# Patient Record
Sex: MALE | Race: BLACK OR AFRICAN AMERICAN
[De-identification: names, ages, dates, MRNs, and addresses within clinical notes are randomized per-mention and may not be internally consistent; named-entity substitution may affect disease eponyms.]

---

## 2017-03-02 ENCOUNTER — HOSPITAL ENCOUNTER (EMERGENCY)
Dept: HOSPITAL 68 - ERH | Age: 62
LOS: 1 days | End: 2017-03-03
Payer: COMMERCIAL

## 2017-03-02 VITALS — BODY MASS INDEX: 42.44 KG/M2 | WEIGHT: 280 LBS | HEIGHT: 68 IN

## 2017-03-02 DIAGNOSIS — K62.5: Primary | ICD-10-CM

## 2017-03-02 LAB
ABSOLUTE GRANULOCYTE CT: 6.3 /CUMM (ref 1.4–6.5)
APTT BLD: 33 SEC (ref 25–37)
BASOPHILS # BLD: 0 /CUMM (ref 0–0.2)
BASOPHILS NFR BLD: 0.3 % (ref 0–2)
EOSINOPHIL # BLD: 0.2 /CUMM (ref 0–0.7)
EOSINOPHIL NFR BLD: 1.9 % (ref 0–5)
ERYTHROCYTE [DISTWIDTH] IN BLOOD BY AUTOMATED COUNT: 13.9 % (ref 11.5–14.5)
GRANULOCYTES NFR BLD: 62.1 % (ref 42.2–75.2)
HCT VFR BLD CALC: 51.4 % (ref 42–52)
LYMPHOCYTES # BLD: 2.7 /CUMM (ref 1.2–3.4)
MCH RBC QN AUTO: 29.7 PG (ref 27–31)
MCHC RBC AUTO-ENTMCNC: 33 G/DL (ref 33–37)
MCV RBC AUTO: 89.8 FL (ref 80–94)
MONOCYTES # BLD: 1 /CUMM (ref 0.1–0.6)
PLATELET # BLD: 176 /CUMM (ref 130–400)
PMV BLD AUTO: 8.8 FL (ref 7.4–10.4)
PROTHROMBIN TIME: 10.8 SEC (ref 9.4–12.5)
RED BLOOD CELL CT: 5.73 /CUMM (ref 4.7–6.1)
WBC # BLD AUTO: 10.1 /CUMM (ref 4.8–10.8)

## 2017-03-02 NOTE — ED GI/GU/ABDOMINAL COMPLAINT
History of Present Illness
 
General
Chief Complaint: General Adult
Stated Complaint: RECTAL BLEED
Source: patient, family, old records
Exam Limitations: no limitations
 
Vital Signs & Intake/Output
Vital Signs & Intake/Output
 Vital Signs
 
 
Date Time Temp Pulse Resp B/P Pulse O2 O2 Flow FiO2
 
     Ox Delivery Rate 
 
03/03 0041 97.2 87 20 152/95 97 Room Air  
 
03/02 2104 98.2 95 20 147/98 96 Room Air  
 
 
 ED Intake and Output
 
 
 03/03 0000 03/02 1200
 
Intake Total  
 
Output Total  
 
Balance  
 
   
 
Patient 280 lb 
 
Weight  
 
 
Allergies
Coded Allergies:
STATINS (Severe, MUSCLE ACHING, "TAKES MY BREATH AWAY" 03/02/17)
soybean (Severe, "CAN'T BREATHE" 03/02/17)
 
Triage Note:
TRIAGE: PT TO ER C/C RECTAL BLEEDING THIS EVENING.
STATES "WHEN I WENT TO THE BATHROOM EVERYTHING
THAT CAME OUT WAS RED".  BRIGHT RED IN COLOR.
UNSURE IF THERE WAS CLOTS. HAS HX OF BLEEDING
HEMMORRHOIDS. HAS BEEN TAKING DOANS FOR BACK PAIN
WHICH HE HAS HAD SINCE THE [facility].
Triage Nurses Notes Reviewed? yes
Onset: Just prior to arrival
Duration: minute(s):, continues in ED
Timing: recent history
Quality/Severity: moderate, painless
Radiation: no radiation
Activities at Onset: moving bowels
Prior Abdominal Problems: similar symptoms
Past Sexual History: Unobtainable at this time
No Modifying Factors: none
HPI:
Patient is been struggling with chronic back pain taking anti-inflammatory 
medications with little relief.
Prior to admission patient reports painless rectal bleeding.  Last year he had a
similar episode lasting 3 days prescribed suppositories.
He denies fever chills nausea vomiting diarrhea abdominal pain dysuria rash 
headache.
 
 
Past History
 
Travel History
Traveled to [place] past 21 day No
 
Medical History
Any Pertinent Medical History? see below for history
Neurological: NONE
EENT: allergies
Cardiovascular: hyperlipidemia
Respiratory: NONE
Gastrointestinal: HEMMORRHOIDS ACID REFLUX
Hepatic: NONE
Renal: NONE
Musculoskeletal: osteoarthritis
Psychiatric: NONE
Endocrine: diabetes
Blood Disorders: NONE
Cancer(s): NONE
GYN/Reproductive: NONE
 
Surgical History
Surgical History: non-contributory
 
Psychosocial History
What is your primary language English
Tobacco Use: Current Not Daily
Daily Tobacco Use Amount/Type: Cigar or Pipe use daily
ETOH Use: occasional use
Illicit Drug Use: denies illicit drug use
 
Family History
Hx Contributory? No
 
Review of Systems
 
Review of Systems
Constitutional:
Reports: no symptoms. 
EENTM:
Reports: no symptoms. 
Respiratory:
Reports: no symptoms. 
Cardiovascular:
Reports: no symptoms. 
GI:
Reports: see HPI, bloody stool. 
Genitourinary:
Reports: no symptoms. 
Musculoskeletal:
Reports: no symptoms. 
Skin:
Reports: no symptoms. 
Neurological/Psychological:
Reports: no symptoms. 
Hematologic/Endocrine:
Reports: no symptoms. 
Immunologic/Allergic:
Reports: no symptoms. 
All Other Systems: Reviewed and Negative
 
Physical Exam
 
Physical Exam
General Appearance: well developed/nourished, alert, awake, anxious, mild 
distress, obese
Head: atraumatic, normal appearance
Eyes:
Bilateral: normal appearance, PERRL, EOMI, normal inspection. 
Ears, Nose, Throat, Mouth: hearing grossly normal, moist mucous membrane
Neck: normal inspection, supple, full range of motion, normal alignment
Respiratory: normal breath sounds, chest non-tender, no respiratory distress, 
quiet respiration, lungs clear
Cardiovascular: regular rate/rhythm, normal peripheral pulses, norml femoral 
pulses equa
Peripheral Pulses:
4+ carotid (R), 4+ carotid (L)
Gastrointestinal: normal bowel sounds, soft, non-tender, no organomegaly
Rectal: normal rectal tone, scanty blood without stool
Male Genitals: normal genitalia
Back: normal inspection, muscle spasm, no vertebral tenderness
Extremities: normal range of motion, no ligament instability
Neurologic/Psych: no motor/sensory deficits, awake, alert, oriented x 3, normal 
gait, normal mood/affect, CNs II-XII nml as tested
Skin: intact, normal color, warm/dry
 
Core Measures
ACS in differential dx? No
Severe Sepsis Present: No
Septic Shock Present: No
 
Progress
Differential Diagnosis: gastritis, hemorrhoids, PUD/GERD
Plan of Care:
 Orders
 
 
Procedure Date/time Status
 
MISTAKE 03/02 2149 Active
 
PARTIAL THROMBOPLASTIN TIME 03/02 2149 Complete
 
PROTHROMBIN TIME 03/02 2149 Complete
 
COMPREHENSIVE METABOLIC PANEL 03/02 2149 Complete
 
CBC WITHOUT DIFFERENTIAL 03/02 2149 Complete
 
 
 Laboratory Tests
 
 
 
03/02/17 2215:
Anion Gap 11, Estimated GFR > 60, BUN/Creatinine Ratio 16.4, Glucose 96, Calcium
9.7, Total Bilirubin 0.6, AST 23, ALT 40, Alkaline Phosphatase 85, Total Protein
7.3, Albumin 3.9, Globulin 3.4, Albumin/Globulin Ratio 1.1, PT 10.8, INR 1.03, 
APTT 33, CBC w Diff NO MAN DIFF REQ, RBC 5.73, MCV 89.8, MCH 29.7, RDW 13.9, MPV
8.8, Gran % 62.1, Lymphocytes % 26.3, Monocytes % 9.4  H, Eosinophils % 1.9, 
Basophils % 0.3, Absolute Granulocytes 6.3, Absolute Lymphocytes 2.7, Absolute 
Monocytes 1.0  H, Absolute Eosinophils 0.2, Absolute Basophils 0, PUBS MCHC 33.0
Initial ED EKG: none
 
Departure
 
Departure
Time of Disposition: 0033
Disposition: HOME OR SELF CARE
Condition: Stable
Clinical Impression
Primary Impression: Rectal bleeding
Referrals:
TAISHA BAIRD,UMESH OVIEDO (PCP/Family)
 
MELCHOR NINO MD
 
Additional Instructions:
Use your lidocaine/steroid suppositories as prescribed
Departure Forms:
Customer Survey
General Discharge Information

## 2017-03-03 ENCOUNTER — HOSPITAL ENCOUNTER (OUTPATIENT)
Dept: HOSPITAL 68 - ERH | Age: 62
Setting detail: OBSERVATION
LOS: 3 days | End: 2017-03-06
Attending: INTERNAL MEDICINE | Admitting: INTERNAL MEDICINE
Payer: COMMERCIAL

## 2017-03-03 VITALS — DIASTOLIC BLOOD PRESSURE: 98 MMHG | SYSTOLIC BLOOD PRESSURE: 148 MMHG

## 2017-03-03 VITALS — BODY MASS INDEX: 42.44 KG/M2 | WEIGHT: 280 LBS | HEIGHT: 68 IN

## 2017-03-03 VITALS — SYSTOLIC BLOOD PRESSURE: 152 MMHG | DIASTOLIC BLOOD PRESSURE: 95 MMHG

## 2017-03-03 VITALS — SYSTOLIC BLOOD PRESSURE: 152 MMHG | DIASTOLIC BLOOD PRESSURE: 102 MMHG

## 2017-03-03 DIAGNOSIS — D12.0: Primary | ICD-10-CM

## 2017-03-03 DIAGNOSIS — K64.8: ICD-10-CM

## 2017-03-03 DIAGNOSIS — F17.200: ICD-10-CM

## 2017-03-03 DIAGNOSIS — E78.5: ICD-10-CM

## 2017-03-03 DIAGNOSIS — K21.9: ICD-10-CM

## 2017-03-03 DIAGNOSIS — K57.90: ICD-10-CM

## 2017-03-03 DIAGNOSIS — N40.0: ICD-10-CM

## 2017-03-03 DIAGNOSIS — I10: ICD-10-CM

## 2017-03-03 DIAGNOSIS — E11.9: ICD-10-CM

## 2017-03-03 LAB
ABSOLUTE GRANULOCYTE CT: 6 /CUMM (ref 1.4–6.5)
ABSOLUTE GRANULOCYTE CT: 6.1 /CUMM (ref 1.4–6.5)
BASOPHILS # BLD: 0 /CUMM (ref 0–0.2)
BASOPHILS # BLD: 0 /CUMM (ref 0–0.2)
BASOPHILS NFR BLD: 0.3 % (ref 0–2)
BASOPHILS NFR BLD: 0.6 % (ref 0–2)
EOSINOPHIL # BLD: 0.1 /CUMM (ref 0–0.7)
EOSINOPHIL # BLD: 0.1 /CUMM (ref 0–0.7)
EOSINOPHIL NFR BLD: 1.3 % (ref 0–5)
EOSINOPHIL NFR BLD: 1.5 % (ref 0–5)
ERYTHROCYTE [DISTWIDTH] IN BLOOD BY AUTOMATED COUNT: 14 % (ref 11.5–14.5)
ERYTHROCYTE [DISTWIDTH] IN BLOOD BY AUTOMATED COUNT: 14.4 % (ref 11.5–14.5)
GRANULOCYTES NFR BLD: 65.4 % (ref 42.2–75.2)
GRANULOCYTES NFR BLD: 72.3 % (ref 42.2–75.2)
HCT VFR BLD CALC: 47.4 % (ref 42–52)
HCT VFR BLD CALC: 49.7 % (ref 42–52)
LYMPHOCYTES # BLD: 1.6 /CUMM (ref 1.2–3.4)
LYMPHOCYTES # BLD: 2.3 /CUMM (ref 1.2–3.4)
MCH RBC QN AUTO: 29.6 PG (ref 27–31)
MCH RBC QN AUTO: 30 PG (ref 27–31)
MCHC RBC AUTO-ENTMCNC: 32.8 G/DL (ref 33–37)
MCHC RBC AUTO-ENTMCNC: 33.5 G/DL (ref 33–37)
MCV RBC AUTO: 89.5 FL (ref 80–94)
MCV RBC AUTO: 90.3 FL (ref 80–94)
MONOCYTES # BLD: 0.6 /CUMM (ref 0.1–0.6)
MONOCYTES # BLD: 0.8 /CUMM (ref 0.1–0.6)
PLATELET # BLD: 158 /CUMM (ref 130–400)
PLATELET # BLD: 161 /CUMM (ref 130–400)
PMV BLD AUTO: 8.7 FL (ref 7.4–10.4)
PMV BLD AUTO: 9.1 FL (ref 7.4–10.4)
PROTHROMBIN TIME: 11.8 SEC (ref 9.4–12.5)
RED BLOOD CELL CT: 5.26 /CUMM (ref 4.7–6.1)
RED BLOOD CELL CT: 5.55 /CUMM (ref 4.7–6.1)
WBC # BLD AUTO: 8.3 /CUMM (ref 4.8–10.8)
WBC # BLD AUTO: 9.4 /CUMM (ref 4.8–10.8)

## 2017-03-03 PROCEDURE — G0378 HOSPITAL OBSERVATION PER HR: HCPCS

## 2017-03-03 NOTE — NUR
PT ADMITTED TO ROOM # 216-1. ORAL REPORT GIVEN TO SOHAIL AKHTAR
ALL V.S.S. CLINICAL STATUS UNCHANGED, PT READY FOR TRANSFER.

## 2017-03-03 NOTE — CONS- GASTROENTEROLOGY
General Information and HPI
 
Consulting Request
Date of Consult: 03/03/17
Requested By:
Emergency Department
 
Reason for Consult:
Hematochezia
Source of Information: patient
History of Present Illness:
62-year-old male with a history of diverticular disease and "abscess" who had 
the acute onset of lower abdominal discomfort, loose bowel movements, and blood 
per rectum last night.  This persisted and he presented to emergency room.  He 
has had no syncope, lightheadedness, upper abdominal pain, antecedent bleeding 
or pain, chronic indigestion.  His last passage of blood was this morning
 
Allergies/Medications
Allergies:
Coded Allergies:
STATINS (Severe, MUSCLE ACHING, "TAKES MY BREATH AWAY" 03/02/17)
soybean (Severe, "CAN'T BREATHE" 03/02/17)
 
Home Med List:
Ammonium Lactate 12 % LOTION SKIN HEALTH  (Reported)
Azelastine HCl 137 MCG (0.1 %) SPRAY.PUMP   2 SPRAY NASB BIDP PRN ITCHING  (
Reported)
Cyclobenzaprine HCl 10 MG TABLET   1 TAB PO QPMP MUSCLE SPASMS  (Reported)
Dexlansoprazole (Dexilant) 60 MG CAP..BP   1 CAP PO DAILY ACID REFLUX  (
Reported)
Lidocaine/Hydrocortisone AC (Lidocaine-Hc 3-1% Cream Kit) 3 %-1 % (7 GRAM) KIT 
PAIN CONTROL  (Reported)
Meloxicam 15 MG TABLET   1 TAB PO DAILY AS NEEDED PAIN CONTROL  (Reported)
Montelukast Sodium 10 MG TABLET   1 TAB PO DAILY PAIN CONTROL  (Reported)
Olmesartan/Hydrochlorothiazide (Benicar Hct 20-12.5 MG Tablet) 20 MG-12.5 MG 
TABLET   1 TAB PO DAILY HIGH BLOOD PRESSURE  (Reported)
Omeprazole 40 MG CAPSULE.DR   1 CAP PO BID Reflux
Sitagliptin Phos/Metformin HCl (Janumet  MG Tablet) 50 MG-500 MG TABLET   
1 TAB PO BID DIABETES  (Reported)
Tamsulosin HCl 0.4 MG CAP.ER.24H   1 CAP PO QPM BLADDER HEALTH  (Reported)
Testosterone Cypionate 200 MG/ML VIAL   1 ML IM Q2W HEALTH SUPPLEMENT  (Reported
)
 
Current Medications:
 Current Medications
 
 
  Sig/Barb Start time  Last
 
Medication Dose Route Stop Time Status Admin
 
Chlorhexidine  1 GM .STK-MED ONE 03/03 1505 DC 
 
Gluconate  TOP 03/03 1506  
 
Sodium Chloride 1,000 ML ONCE ONE 03/03 1200 AC 03/03
 
  IV 03/03 1959  1237
 
Sodium Phosphate 1 UNIT ONCE ONE 03/03 1245 DC 03/03
 
  DC 03/03 1246  1314
 
 
 
 
Past History
 
Travel History
Traveled to [place] past 21 day No
 
Medical History
Neurological: NONE
EENT: allergies
Cardiovascular: hyperlipidemia
Respiratory: NONE
Gastrointestinal: HEMMORRHOIDS ACID REFLUX diverticulosis
Hepatic: NONE
Renal: NONE
Musculoskeletal: osteoarthritis
Psychiatric: NONE
Endocrine: diabetes
Blood Disorders: NONE
Cancer(s): NONE
GYN/Reproductive: NONE
 
Surgical History
Surgical History: non-contributory
 
Review of Systems
 
Review of Systems
Constitutional:
Denies: chills, fever. 
EENTM:
Denies: icterus, epistaxis. 
Cardiovascular:
Denies: chest pain, syncope. 
Respiratory:
Denies: cough, hemoptysis, short of breath. 
GI:
Reports: see HPI. 
Genitourinary:
Denies: dysuria, hematuria. 
Musculoskeletal:
Denies: joint swelling, neck pain. 
Skin:
Denies: jaundice, lesions. 
Neurological/Psychological:
Denies: cognitive dysfunction, numbness. 
Hematologic/Endocrine:
Reports: bleeding.  Denies: bruising. 
 
Exam & Diagnostic Data
Vital Signs and I&O
Vital Signs
 
 
Date Time Temp Pulse Resp B/P Pulse O2 O2 Flow FiO2
 
     Ox Delivery Rate 
 
03/03 1256 98.3 76 16 143/95 95 Room Air  
 
03/03 1240     97 Room Air  
 
03/03 1026 97.6 96 18 146/108 97 Room Air  
 
 
 Intake & Output
 
 
 03/03 1600 03/03 0400 03/02 1600 03/02 0400 03/01 1600 03/01 0400
 
Intake Total      
 
Output Total      
 
Balance      
 
       
 
Number 1     
 
Bowel      
 
Movements      
 
Patient 280 lb     
 
Weight      
 
 
 
Physical Exam:
Well-developed, obese -American male, no apparent distress.  Alert and 
oriented with normal cognition.  Skin without lesion.  No adenopathy.  No 
scleral icterus.  No oropharyngeal lesion.  Heart regular rhythm.  Lungs clear 
bilaterally.  Abdomen obese and soft with no tenderness, mass or organomegaly, 
with normal bowel sounds.  No edema.
 
Results
Pertinent Lab Results:
 Laboratory Tests
 
 
 03/03 03/03
 
 1137 1055
 
Chemistry  
 
  Sodium (137 - 145 mmol/L) 138 
 
  Potassium (3.5 - 5.1 mmol/L) 4.1 
 
  Chloride (98 - 107 mmol/L) 101 
 
  Carbon Dioxide (22 - 30 mmol/L) 27 
 
  Anion Gap (5 - 16) 10 
 
  BUN (9 - 20 mg/dL) 16 
 
  Creatinine (0.7 - 1.2 mg/dL) 0.9 
 
  Estimated GFR (>60 ml/min) > 60 
 
  BUN/Creatinine Ratio (7 - 25 %) 17.8 
 
  Glucose (65 - 99 mg/dL) 98 
 
  Calcium (8.4 - 10.2 mg/dL) 9.5 
 
Coagulation  
 
  PT (9.4 - 12.5 SEC) 11.8 
 
  INR (0.90 - 1.17) 1.13 
 
Hematology  
 
  CBC w Diff NO MAN DIFF REQ 
 
  WBC (4.8 - 10.8 /CUMM) 8.3 
 
  RBC (4.70 - 6.10 /CUMM) 5.55 
 
  Hgb (14.0 - 18.0 G/DL) 16.7 
 
  Hct (42 - 52 %) 49.7 
 
  MCV (80.0 - 94.0 FL) 89.5 
 
  MCH (27.0 - 31.0 PG) 30.0 
 
  RDW (11.5 - 14.5 %) 14.0 
 
  Plt Count (130 - 400 /CUMM) 161 
 
  MPV (7.4 - 10.4 FL) 9.1 
 
  Gran % (42.2 - 75.2 %) 72.3 
 
  Lymphocytes % (20.5 - 51.1 %) 18.9  L 
 
  Monocytes % (1.7 - 9.3 %) 6.9 
 
  Eosinophils % (0 - 5 %) 1.3 
 
  Basophils % (0.0 - 2.0 %) 0.6 
 
  Absolute Granulocytes (1.4 - 6.5 /CUMM) 6.0 
 
  Absolute Lymphocytes (1.2 - 3.4 /CUMM) 1.6 
 
  Absolute Monocytes (0.10 - 0.60 /CUMM) 0.6 
 
  Absolute Eosinophils (0.0 - 0.7 /CUMM) 0.1 
 
  Absolute Basophils (0.0 - 0.2 /CUMM) 0 
 
  PUBS MCHC (33.0 - 37.0 G/DL) 33.5 
 
Urines  
 
  Urine Color (YEL,AMB,STR)  YEL
 
  Urine Clarity (CLEAR)  CLEAR
 
  Urine pH (5.0 - 8.0)  6.0
 
  Ur Specific Gravity (1.001 - 1.035)  1.020
 
  Urine Protein (NEG,<30 MG/DL)  NEG
 
  Urine Ketones (NEG)  NEG
 
  Urine Nitrite (NEG)  NEG
 
  Urine Bilirubin (NEG)  NEG
 
  Urine Urobilinogen (0.1  -  1.0 EU/dl)  0.2
 
  Ur Leukocyte Esterase (NEG)  NEG
 
  Ur Microscopic  SEDIMENT EXAMINED
 
  Urine RBC (0 - 5 /HPF)  1-3
 
  Urine WBC (0 - 2 /HPF)  RARE
 
  Urine Bacteria (NEG/NONE)  RARE  H
 
  Urine Mucus (FEW,NONE)  FEW
 
  Urine Hemoglobin (NEG)  TRACE-INTACT  H
 
  Urine Glucose (N MG/DL)  NEG
 
 
 
 
Assessment/Plan
Assessment/Recommendations:
Acute onset hematochezia without hemodynamic compromise, and with normal 
hematocrit, without change since last night.  Most likely hemorrhoidal/outlet 
bleeding (although with loose bowel movement and pain, which is not consistent),
but rule out diverticular or other colonic source.
 
Recommendations
* Flexible sigmoidoscopy to be performed, with further recommendations to follow
* Follow CBC
 
 
Consult Acknowledgment
- Thank you for your consult request.

## 2017-03-03 NOTE — NUR
PT RETUNED TO ROOM FROM GI.
ACCORDING TO REPORT FROM GI RN, BLEEDING APPEARS TO BE COMING FROM AN INFLAMMED
DIVERTICULA. PT AWAKE AND ALERT, ALL V.S.S.

## 2017-03-03 NOTE — HISTORY & PHYSICAL
**See Addendum**
General Information and HPI
MD Statement:
I have seen and personally examined JACINTO MCKEON JR and documented this H&P.
 
The patient is a 62 year old M who presented with a patient stated chief 
complaint of [lower GI bleeding].
 
Source of Information: patient, family, old records
Exam Limitations: no limitations
History of Present Illness:
Mr. Mckeon 62 year old male with chief complaint of lower GI bleeding.  
Patient has past medical history of diverticulosis, diabetes, hypertension, GERD
, BPH, chronic back pain.
Patient reported that yesterday he started to have mai bleeding per rectum 
with minimal stool, he had 7 episodes up to now, he visited the ED yesterday and
received IV fluid and was discharged home, came again this afternoon with 
excessive rectal bleeding and had sigmoidoscope by Dr. Post.  Patient 
reported that his last normal bowel movement was yesterday morning, he used new 
medication "Doans" for back pain which is aspirin 2 tablets twice a day, and 
started to have bleeding per rectum afterwords.  He denied any abdominal pain, 
nausea or vomiting, urinary changes dysuria or hematuria, bleeding from nose, 
mouth, ears, bruises.
Patient denied chest pain, palpitation, shortness of breath, lightheadedness or 
dizziness.
Patient had his last colonoscope 2 years ago with Dr. Anna, few polyps were 
removed and was asked to do follow up colonoscopy in 5 years.
He denied history of constipation although he used to take Colace every other 
day. 
 
 
Allergies/Medications
Allergies:
Coded Allergies:
STATINS (Severe, MUSCLE ACHING, "TAKES MY BREATH AWAY" 03/02/17)
soybean (Severe, "CAN'T BREATHE" 03/02/17)
 
Home Med list
Ammonium Lactate 12 % LOTION SKIN HEALTH  (Reported)
Azelastine HCl 137 MCG (0.1 %) SPRAY.PUMP   2 SPRAY NASB BIDP PRN ITCHING  (
Reported)
Cyclobenzaprine HCl 10 MG TABLET   1 TAB PO QPMP MUSCLE SPASMS  (Reported)
Dexlansoprazole (Dexilant) 60 MG CAP.BP   1 CAP PO DAILY ACID REFLUX  (
Reported)
Lidocaine/Hydrocortisone AC (Lidocaine-Hc 3-1% Cream Kit) 3 %-1 % (7 GRAM) KIT 
PAIN CONTROL  (Reported)
Meloxicam 15 MG TABLET   1 TAB PO DAILY AS NEEDED PAIN CONTROL  (Reported)
Montelukast Sodium 10 MG TABLET   1 TAB PO DAILY PAIN CONTROL  (Reported)
Olmesartan/Hydrochlorothiazide (Benicar Hct 20-12.5 MG Tablet) 20 MG-12.5 MG 
TABLET   1 TAB PO DAILY HIGH BLOOD PRESSURE  (Reported)
Sitagliptin Phos/Metformin HCl (Janumet  MG Tablet) 50 MG-500 MG TABLET   
1 TAB PO BID DIABETES  (Reported)
Tamsulosin HCl 0.4 MG CAP.ER.24H   1 CAP PO QPM BLADDER HEALTH  (Reported)
Testosterone Cypionate 200 MG/ML VIAL   1 ML IM Q2W HEALTH SUPPLEMENT  (Reported
)
 
 
Past History
 
Travel History
Traveled to [place] past 21 day No
 
Medical History
Blood Transfusion Hx: No
Neurological: NONE
EENT: allergies
Cardiovascular: hyperlipidemia
Respiratory: NONE
Gastrointestinal: HEMMORRHOIDS ACID REFLUX diverticulosis
Hepatic: NONE
Renal: NONE
Musculoskeletal: osteoarthritis
Psychiatric: NONE
Endocrine: diabetes
Blood Disorders: NONE
Cancer(s): NONE
GYN/Reproductive: NONE
Influenza Vaccine: 10/01/16
 
Surgical History
Surgical History: non-contributory
 
Past Family/Social History
 
Psychosocial History
Smoking Status: Light Tobacco Smoker
 
Review of Systems
 
Review of Systems
Constitutional:
Denies: fever, malaise. 
EENTM:
Denies: blurred vision, nasal congestion. 
Cardiovascular:
Denies: chest pain, palpitations. 
Respiratory:
Denies: cough, short of breath. 
GI:
Denies: abdominal pain, nausea, vomiting. 
Genitourinary:
Denies: dysuria, hematuria. 
Musculoskeletal:
Reports: back pain.  Denies: joint pain. 
Neurological/Psychological:
Denies: headache. 
 
Exam & Diagnostic Data
Last 24 Hrs of Vital Signs/I&O
 Vital Signs
 
 
Date Time Temp Pulse Resp B/P Pulse O2 O2 Flow FiO2
 
     Ox Delivery Rate 
 
03/03 1811 98.2 75 20 148/98 96 Room Air  
 
03/03 1728  84 18 142/93 97 Room Air  
 
03/03 1510 97.9 67 18 138/83 98 Room Air  
 
03/03 1256 98.3 76 16 143/95 95 Room Air  
 
03/03 1240     97 Room Air  
 
03/03 1026 97.6 96 18 146/108 97 Room Air  
 
 
 Intake & Output
 
 
 03/03 1600 03/03 0800 03/03 0000
 
Intake Total   
 
Output Total   
 
Balance   
 
    
 
Number 1  
 
Bowel   
 
Movements   
 
Patient 127.006 kg  
 
Weight   
 
 
 
 
Physical Exam
General Appearance Alert, Oriented X3, Cooperative, No Acute Distress
Skin No Rashes, No Breakdown, No Significant Lesion
HEENT Atraumatic, PERRLA, EOMI, Mucous Membr. moist/pink
Neck Supple, No JVD
Cardiovascular Regular Rate, Normal S1, Normal S2, No Murmurs
Lungs Clear to Auscultation, Normal Air Movement
Abdomen Normal Bowel Sounds, Soft, No Tenderness
Neurological Normal Speech, Strength at 5/5 X4 Ext, Normal Tone, Sensation 
Intact, Cranial Nerves 3-12 NL, Reflexes 2+
Extremities No Clubbing, No Cyanosis, No Edema, Normal Pulses
Vascular Normal Pulses, Pulses Symmetrical
 
Assessment/Plan
Assessment:
Mr. Mckeon 62 year old male with chief complaint of lower GI bleeding.  
Patient has past medical history of diverticulosis, diabetes, hypertension, GERD
, BPH, chronic back pain.
 
 
Vital signs
Temperature 97.6, pulse 96, blood pressure 146/108, respiration 18 on room air 
saturation 97%
 
Lab
H&H 15.6/47.4, BMP within normal
 
Sigmoidoscopy
Impression:
* Diverticulosis
* Enlarged internal hemorrhoids, without evidence of active or recent bleeding
 
 
 
 Plan
-Observed the patient for 24 hours
-Advance the diet to clear liquid diet
-Continue monitor CBC twice a day
-Continue home medication except for aspirin and nonsteroidal anti-inflammatory 
drugs
-Code full
-DVT prophylaxis ALPS 
 
As Ranked By This Provider
Problem List:
 1. Rectal bleeding
 
 
Core Measures/Miscellaneous
 
Acute Coronary Syndrome
ACS Diagnosis: No
 
Cerebrovascular Accident
CVA/TIA Diagnosis: No
 
Congestive Heart Failure
CHF Diagnosis: No
 
Venous Thromboembolism
VTE Risk Factors: Age > 40
No Mech VTE prophylaxis d/t: No contraindications
No VTE Pharm Prophylaxis d/t: Active bleeding
VTE Diagnosis: No
VTE Type: NONE
VTE Confirmed by (Test): NONE
 
Severe Sepsis
Severe Sepsis Present: No
 
Septic Shock
Septic Shock Present: No
 
Miscellaneous Documentation
Attending Case Discussed With:
ELOY PEREZ MD
 
Primary Care Physician:
UMESH SUMNER MD
 
Patient sees these Specialists
GI
Podiatry
Level of Patient Care: ED Observation

## 2017-03-03 NOTE — NUR
61 Y/O MALE C/O RECTAL BLEEDING.  STATES HE WAS
EVAL'D IN ED LAST NIGHT AND CALLED DR JONES
TODAY, "HE SAID I WAS SUPPOSED TO BE ADMITTED". 
DENIES ANY NEW/ADDITIONAL COMPLAINTS.

## 2017-03-03 NOTE — NUR
Emergency Dept UC Admit Note:
 
To be admitted to [facility] by DR PEREZ with ACUTE LOWER GI BLEED as
the diagnosis, to GEN MED/OBSERVATION location.
 
Nursing Supervisor and admitting notified 03/03/17 at 1612
 
PT WILL GO TO ROOM 216-1

## 2017-03-03 NOTE — ED GI/GU/ABDOMINAL COMPLAINT
History of Present Illness
 
General
Chief Complaint: General Adult
Stated Complaint: RECTAL BLEEDING, WAS HERE LAST NIGHT
Source: patient, family, old records
Exam Limitations: no limitations
 
Vital Signs & Intake/Output
Vital Signs & Intake/Output
 Vital Signs
 
 
Date Time Temp Pulse Resp B/P Pulse O2 O2 Flow FiO2
 
     Ox Delivery Rate 
 
03/03 1510 97.9 67 18 138/83 98 Room Air  
 
03/03 1256 98.3 76 16 143/95 95 Room Air  
 
03/03 1240     97 Room Air  
 
03/03 1026 97.6 96 18 146/108 97 Room Air  
 
 
Allergies
Coded Allergies:
STATINS (Severe, MUSCLE ACHING, "TAKES MY BREATH AWAY" 03/02/17)
soybean (Severe, "CAN'T BREATHE" 03/02/17)
 
Reconcile Medications
Ammonium Lactate 12 % LOTION SKIN HEALTH  (Reported)
Azelastine HCl 137 MCG (0.1 %) SPRAY.PUMP   2 SPRAY NASB BIDP PRN ITCHING  (
Reported)
Cyclobenzaprine HCl 10 MG TABLET   1 TAB PO QPMP MUSCLE SPASMS  (Reported)
Dexlansoprazole (Dexilant) 60 MG CAP.BP   1 CAP PO DAILY ACID REFLUX  (
Reported)
Lidocaine/Hydrocortisone AC (Lidocaine-Hc 3-1% Cream Kit) 3 %-1 % (7 GRAM) KIT 
PAIN CONTROL  (Reported)
Meloxicam 15 MG TABLET   1 TAB PO DAILY AS NEEDED PAIN CONTROL  (Reported)
Montelukast Sodium 10 MG TABLET   1 TAB PO DAILY PAIN CONTROL  (Reported)
Olmesartan/Hydrochlorothiazide (Benicar Hct 20-12.5 MG Tablet) 20 MG-12.5 MG 
TABLET   1 TAB PO DAILY HIGH BLOOD PRESSURE  (Reported)
Sitagliptin Phos/Metformin HCl (Janumet  MG Tablet) 50 MG-500 MG TABLET   
1 TAB PO BID DIABETES  (Reported)
Tamsulosin HCl 0.4 MG CAP.ER.24H   1 CAP PO QPM BLADDER HEALTH  (Reported)
Testosterone Cypionate 200 MG/ML VIAL   1 ML IM Q2W HEALTH SUPPLEMENT  (Reported
)
 
Triage Note:
61 Y/O MALE C/O RECTAL BLEEDING.  STATES HE WAS
 EVAL'D IN ED LAST NIGHT AND CALLED DR JONES
 TODAY, "HE SAID I WAS SUPPOSED TO BE ADMITTED".
 DENIES ANY NEW/ADDITIONAL COMPLAINTS.
Triage Nurses Notes Reviewed? yes
HPI:
Patient is a 62 year old male presents complaining of rectal bleeding.  Bleeding
onset yesterday.  6 episodes of blood per rectum since onset.  Patient was seen 
in the ED yesterday evening, given IV fluids, protonix IV and discharge home.  
Patient called Gi Dr. Post today and was referred back to the ED for 
continued epsiodes.  Mild diffuse abdominal discomfort.  Patient with history of
internal hemorrhoid, tried using a steroid suppository with no improvement.  
Decreased food and fluid intake over the past 24 hours.  Patient takes meloxicam
regularly.  Occasionally takes Advil or aleve, also took Jeffy(aspirin) yesterday
for low back pain.  Denies lightheadedness, chest pain, dyspnea, nausea, 
vomiting, fevers, chills.
(FREDDY BECKHAM)
 
Past History
 
Travel History
Traveled to [place] past 21 day No
 
Medical History
Any Pertinent Medical History? see below for history
Neurological: NONE
EENT: allergies
Cardiovascular: hyperlipidemia
Respiratory: NONE
Gastrointestinal: HEMMORRHOIDS ACID REFLUX, diverticulosis
Hepatic: NONE
Renal: NONE
Musculoskeletal: osteoarthritis
Psychiatric: NONE
Endocrine: diabetes
Blood Disorders: NONE
Cancer(s): NONE
GYN/Reproductive: NONE
 
Surgical History
Surgical History: non-contributory
 
Psychosocial History
What is your primary language English
Tobacco Use: Never used
 
Family History
Hx Contributory? No
(FREDDY BECKHAM)
 
Review of Systems
 
Review of Systems
Constitutional:
Denies: chills, fever. 
EENTM:
Reports: no symptoms. 
Respiratory:
Denies: cough, short of breath. 
Cardiovascular:
Denies: chest pain, syncope. 
GI:
Reports: see HPI, abdominal pain, bloody stool.  Denies: nausea, vomiting. 
Genitourinary:
Reports: no symptoms. 
Musculoskeletal:
Reports: back pain. 
Skin:
Reports: no symptoms. 
Neurological/Psychological:
Reports: no symptoms. 
Hematologic/Endocrine:
Reports: see HPI. 
Immunologic/Allergic:
Reports: no symptoms. 
(FREDDY BECKHAM)
 
Physical Exam
 
Physical Exam
General Appearance: well developed/nourished, alert, awake, obese
Head: atraumatic, normal appearance
Eyes:
Bilateral: normal appearance, PERRL, EOMI. 
Ears, Nose, Throat, Mouth: hearing grossly normal, moist mucous membrane
Neck: normal inspection, supple, full range of motion
Respiratory: normal breath sounds, chest non-tender, no respiratory distress, 
lungs clear
Cardiovascular: regular rate/rhythm (borderline tachycardic)
Gastrointestinal: normal bowel sounds, soft, non-tender
Rectal: small amount of bright red blood in rectal vault.  No external 
hemorrhoid
Back: normal inspection, normal range of motion, no vertebral tenderness
Extremities: normal range of motion
Neurologic/Psych: no motor/sensory deficits, awake, alert, oriented x 3, normal 
gait, normal mood/affect
Skin: intact, normal color, warm/dry
 
Core Measures
ACS in differential dx? No
Severe Sepsis Present: No
Septic Shock Present: No
(ALEJANDRA SCHAFER,FREDDY)
 
Progress
Differential Diagnosis: bleeding hemorrhoids, diverticular bleed, upper GI bleed
Plan of Care:
 Orders
 
 
Procedure Date/time Status
 
Clear Liquid Diet 03/03 D Active
 
Patient Data 03/03 1557 Active
 
Place in observation 03/03 1548 Active
 
Vital Signs 03/03 1505 Active
 
Code Status 03/03 1505 Active
 
MISTAKE 03/03 1119 Active
 
PROTHROMBIN TIME 03/03 1119 Complete
 
BASIC METABOLIC PANEL 03/03 1119 Complete
 
TYPE & SCREEN (NOT X-MATCH) 03/03 1119 Complete
 
CBC WITHOUT DIFFERENTIAL 03/03 1108 Complete
 
URINALYSIS 03/03 1054 Complete
 
 
 Laboratory Tests
 
 
 
03/03/17 1137:
Anion Gap 10, Estimated GFR > 60, BUN/Creatinine Ratio 17.8, Glucose 98, Calcium
9.5, PT 11.8, INR 1.13, CBC w Diff NO MAN DIFF REQ, RBC 5.55, MCV 89.5, MCH 30.0
, RDW 14.0, MPV 9.1, Gran % 72.3, Lymphocytes % 18.9  L, Monocytes % 6.9, 
Eosinophils % 1.3, Basophils % 0.6, Absolute Granulocytes 6.0, Absolute 
Lymphocytes 1.6, Absolute Monocytes 0.6, Absolute Eosinophils 0.1, Absolute 
Basophils 0, PUBS MCHC 33.5
 
03/03/17 1055:
Urine Color YEL, Urine Clarity CLEAR, Urine pH 6.0, Ur Specific Gravity 1.020, 
Urine Protein NEG, Urine Ketones NEG, Urine Nitrite NEG, Urine Bilirubin NEG, 
Urine Urobilinogen 0.2, Ur Leukocyte Esterase NEG, Ur Microscopic SEDIMENT 
EXAMINED, Urine RBC 1-3, Urine WBC RARE, Urine Bacteria RARE  H, Urine Mucus FEW
, Urine Hemoglobin TRACE-INTACT  H, Urine Glucose NEG
1135: Discussed with Dr. Samuel.
3/3/2017 12:05:21 PM: Results of repeat CBC discussed with patient.  Patient 
resting comfortably.  No significant change in hemoglobin since yesterday 
evening.  Awaiting call back from Dr. Post to discuss patient.
1215: Discussed with Dr. Post: will take patient for sigmoidoscopy this 
afternoon, will call back prior as patient will need enema approximately 30 
minutes prior to arrival.
1500: Discussed with Dr. Post: patient has internal hemorrhoids, no bleeding
from the hemorrhoids.  Diverticulosis seen on the sigmoidoscopy but no active 
bleeding in the area visualized.  Given extent of patient's diverticulosis, 
recommends bringing for observation, clear liquid diet, serial CBC's(twice a day
), contact immediately if any emergent changes
(FREDDY BECKHAM)
Initial ED EKG: none
(FREDDY BECKHAM)
 
Departure
 
Departure
Time of Disposition: 1551
Disposition: STILL A PATIENT
Condition: Stable
Clinical Impression
Primary Impression: Rectal bleeding
Referrals:
TAISHA BAIRD,UMESH OVIEDO (PCP/Family)
 
Departure Forms:
Customer Survey
General Discharge Information
 
Observation Note
Spoke With:
ELOY PEREZ MD
Physician Advisor Notified: MADDIE BAIRD,LILIANE CHAN
Place Patient In: Non-ED OBS Care Area
Rationale for Observation:
My rational for observation is as follows: Serial complete blood cell counts, 
monitoring for any worsening bleeding.  Patient had sigmoidoscopy this afternoon
and gastroenterology recommends observation based on the findings of the 
sigmoidoscopy.
 
(FREDDY BECKHMA)
 
PA/NP Co-Sign Statement
Statement:
ED Attending supervision documentation-
 
[X] I saw and evaluated the patient. I have also reviewed all the pertinent lab 
results and diagnostic results. I agree with the findings and the plan of care 
as documented in the PA's/NP's documentation. 
 
[X] I have reviewed the ED Record and agree with the PA's/NP's documentation.
 
[] Additions or exceptions (if any) to the PAs/NP's note and plan are 
summarized below:
[]
 
(DANIEL BAIRD,TA)

## 2017-03-03 NOTE — PROC NOTE GASTROENTEROLOGY
Gastroenterology Procedure
Procedure Date: 03/03/17
GI Procedure(s):
Flexible sigmoidoscopy
:
Quan Post M.D.
 
ASA Classification: III
Indications:
Hematochezia
Meds Received:
None
Patient's Tolerance: good
Complications:
None
Extent Reached:
45 cm
Procedure:
The patient signed informed consent, and was placed in the Griffith position.  
Examination of the rectum was normal.  The Olympus high-definition variable 
stiffness colonoscope was inserted through the anus and advanced to the left 
colon, 45 cm from the anus..  Retroflexion was performed in order to examine the
rectum.
Findings:
There were enlarged internal hemorrhoids, but no friability, bleeding site, or 
evidence of recent bleeding such as nipple/geeta.  There was no blood within the 
distal rectum.  The rectal mucosa was normal.  There was sigmoid diverticulosis 
with scant red blood coating areas of the mucosa.  There was abundant stool, 
formed.  No active bleeding site, nor clot was identified.
Impression:
* Diverticulosis
* Enlarged internal hemorrhoids, without evidence of active or recent bleeding
Recommendations:
* Observation
* Clear liquid diet
* Follow-up CBC twice a day
CC:
TAISHA BAIRD,UMESH OVIEDO

## 2017-03-03 NOTE — NUR
ADMINISTERED FLEETS ENEMA AS ORDERED WITH POSITIVE RESULTS
PT HAD LARGE SEMI FORMED BOWEL MOVEMENT WITH BLOODY WATER NOTED. 
PT TOLERATED PROCEEDURE WELL.

## 2017-03-03 NOTE — NUR
NURSING NOTE: PATIENT ARRIVED TO FLOOR VIA STRETCHER WITH DISTRIBUTION FROM
ER. PATIENT A/OX3, ROOM AIR, SKIN INTACT, VSS. PATIENT INDEPENDENTLY WALKED
FROM STRETCHER TO BED. IV RUNNING NS @ 125 FROM ER AS PER ORDER. PATIENT'S
BELONGINGS BROUGHT FROM ER TO PATIENT ROOM PLACED IN CABINETS IN ROOM BY
SPOUSE. CLEAR LIQUID DIET IN PLACE. HAT PLACED IN BATHROOM, PATIENT INFORMED
TO HAVE BOWEL MOVEMENTS IN HAT FOR THEM TO BE TESTED. WILL CONTINUE TO
MONITOR.

## 2017-03-04 VITALS — SYSTOLIC BLOOD PRESSURE: 142 MMHG | DIASTOLIC BLOOD PRESSURE: 81 MMHG

## 2017-03-04 VITALS — DIASTOLIC BLOOD PRESSURE: 86 MMHG | SYSTOLIC BLOOD PRESSURE: 139 MMHG

## 2017-03-04 VITALS — DIASTOLIC BLOOD PRESSURE: 80 MMHG | SYSTOLIC BLOOD PRESSURE: 132 MMHG

## 2017-03-04 VITALS — SYSTOLIC BLOOD PRESSURE: 137 MMHG | DIASTOLIC BLOOD PRESSURE: 89 MMHG

## 2017-03-04 LAB
ABSOLUTE GRANULOCYTE CT: 5 /CUMM (ref 1.4–6.5)
BASOPHILS # BLD: 0 /CUMM (ref 0–0.2)
BASOPHILS NFR BLD: 0.4 % (ref 0–2)
EOSINOPHIL # BLD: 0.2 /CUMM (ref 0–0.7)
EOSINOPHIL NFR BLD: 2 % (ref 0–5)
ERYTHROCYTE [DISTWIDTH] IN BLOOD BY AUTOMATED COUNT: 14.2 % (ref 11.5–14.5)
GRANULOCYTES NFR BLD: 62 % (ref 42.2–75.2)
HCT VFR BLD CALC: 45.5 % (ref 42–52)
LYMPHOCYTES # BLD: 2.1 /CUMM (ref 1.2–3.4)
MCH RBC QN AUTO: 30.3 PG (ref 27–31)
MCHC RBC AUTO-ENTMCNC: 33.8 G/DL (ref 33–37)
MCV RBC AUTO: 89.6 FL (ref 80–94)
MONOCYTES # BLD: 0.8 /CUMM (ref 0.1–0.6)
PLATELET # BLD: 162 /CUMM (ref 130–400)
PMV BLD AUTO: 9.3 FL (ref 7.4–10.4)
RED BLOOD CELL CT: 5.07 /CUMM (ref 4.7–6.1)
WBC # BLD AUTO: 8.1 /CUMM (ref 4.8–10.8)

## 2017-03-04 NOTE — PN- GASTROENTEROLOGY
Assessment/Plan
Assessment/Recommendations:
Hematochezia, stopped.  Possible small diverticular bleed, versus hemorrhoidal (
although hemorrhoids were not bleeding or friability time of sigmoidoscopy 
yesterday).  Hematocrit has remained stable over 48 hours.
 
Recommendations
* Advance to regular diet
* May send home tonight if without evidence of recurrent bleeding.
* Follow-up with Dr. Anna in 10-14 days
 
 
Subjective
Subjective:
No bowel movements today.  No rectal bleeding.  Abdomen according to the patient
is somewhat bloated and tender.
 
Objective
Vital Signs and I&Os
Vital Signs
 
 
Date Time Temp Pulse Resp B/P Pulse O2 O2 Flow FiO2
 
     Ox Delivery Rate 
 
03/04 0654 98.2 63 20 139/86 95   
 
03/04 0016  61  137/89 94   
 
03/03 2207 97.4 70 20 152/102 96 Room Air  
 
03/03 1811 98.2 75 20 148/98 96 Room Air  
 
03/03 1728  84 18 142/93 97 Room Air  
 
03/03 1510 97.9 67 18 138/83 98 Room Air  
 
 
 Intake & Output
 
 
 03/04 1600 03/04 0400 03/03 1600 03/03 0400 03/02 1600 03/02 0400
 
Intake Total 220 740    
 
Output Total 1600 1000    
 
Balance -1380 -260    
 
       
 
Intake,  500    
 
Intake, Oral 120 240    
 
Number  1 1   
 
Bowel      
 
Movements      
 
Output, Urine 1600 1000    
 
Patient  280 lb 280 lb   
 
Weight      
 
 
 
Physical Exam:
Abdomen is mildly distended, soft.  There is no tenderness to palpation.
Current Medications:
 Current Medications
 
 
  Sig/Barb Start time  Last
 
Medication Dose Route Stop Time Status Admin
 
Acetaminophen 650 MG Q6P PRN 03/03 1930 AC 
 
  PO   
 
Acetaminophen 1,000 MG Q6P PRN 03/03 1930 AC 03/04
 
  IV   0414
 
Chlorhexidine  1 GM .STK-MED ONE 03/03 1505 DC 
 
Gluconate  TOP 03/03 1506  
 
Insulin Aspart 0 TIDAC 03/04 1200 AC 
 
  SC   
 
Losartan Potassium 50 MG DAILY 03/04 1600 AC 
 
  PO   
 
Montelukast Sodium 10 MG DAILY 03/04 1000 AC 
 
  PO   
 
Morphine Sulfate 2 MG Q6-PRN PRN 03/03 1945 AC 
 
  IV   
 
Omeprazole 20 MG DAILY AC 03/04 0700 AC 03/04
 
  PO   0422
 
Ondansetron HCl 4 MG ONCE ONE 03/03 2145 DC 03/03
 
  IV 03/03 2146  2200
 
Pantoprazole Sodium 20 MG DAILY 03/03 2200 DC 
 
  IV   
 
Pantoprazole Sodium 20 MG ONCE ONE 03/03 2145 DC 03/03
 
  IV 03/03 2146  2238
 
Patient Medication  1 UNIT ONE NR 03/04 0930 DC 
 
Teaching  ED 03/04 1000  
 
Sodium Chloride 1,000 ML ONCE ONE 03/03 1200 DC 03/03
 
  IV 03/03 1959  1237
 
Tamsulosin HCl 0.4 MG QPM 03/04 2200 AC 
 
  PO   
 
 
 
 
Results
Pertinent Lab Results:
 Laboratory Tests
 
 
 03/04 03/03 0626 2023
 
Hematology  
 
  CBC w Diff NO MAN DIFF REQ NO MAN DIFF REQ
 
  WBC (4.8 - 10.8 /CUMM) 8.1 9.4
 
  RBC (4.70 - 6.10 /CUMM) 5.07 5.26
 
  Hgb (14.0 - 18.0 G/DL) 15.4 15.6
 
  Hct (42 - 52 %) 45.5 47.4
 
  MCV (80.0 - 94.0 FL) 89.6 90.3
 
  MCH (27.0 - 31.0 PG) 30.3 29.6
 
  RDW (11.5 - 14.5 %) 14.2 14.4
 
  Plt Count (130 - 400 /CUMM) 162 158
 
  MPV (7.4 - 10.4 FL) 9.3 8.7
 
  Gran % (42.2 - 75.2 %) 62.0 65.4
 
  Lymphocytes % (20.5 - 51.1 %) 25.8 24.7
 
  Monocytes % (1.7 - 9.3 %) 9.8  H 8.1
 
  Eosinophils % (0 - 5 %) 2.0 1.5
 
  Basophils % (0.0 - 2.0 %) 0.4 0.3
 
  Absolute Granulocytes (1.4 - 6.5 /CUMM) 5.0 6.1
 
  Absolute Lymphocytes (1.2 - 3.4 /CUMM) 2.1 2.3
 
  Absolute Monocytes (0.10 - 0.60 /CUMM) 0.8  H 0.8  H
 
  Absolute Eosinophils (0.0 - 0.7 /CUMM) 0.2 0.1
 
  Absolute Basophils (0.0 - 0.2 /CUMM) 0 0
 
  PUBS MCHC (33.0 - 37.0 G/DL) 33.8 32.8  L
 
 
 
 
 03/03 03/03
 
 1137 1055
 
Chemistry  
 
  Sodium (137 - 145 mmol/L) 138 
 
  Potassium (3.5 - 5.1 mmol/L) 4.1 
 
  Chloride (98 - 107 mmol/L) 101 
 
  Carbon Dioxide (22 - 30 mmol/L) 27 
 
  Anion Gap (5 - 16) 10 
 
  BUN (9 - 20 mg/dL) 16 
 
  Creatinine (0.7 - 1.2 mg/dL) 0.9 
 
  Estimated GFR (>60 ml/min) > 60 
 
  BUN/Creatinine Ratio (7 - 25 %) 17.8 
 
  Glucose (65 - 99 mg/dL) 98 
 
  Calcium (8.4 - 10.2 mg/dL) 9.5 
 
Coagulation  
 
  PT (9.4 - 12.5 SEC) 11.8 
 
  INR (0.90 - 1.17) 1.13 
 
Hematology  
 
  CBC w Diff NO MAN DIFF REQ 
 
  WBC (4.8 - 10.8 /CUMM) 8.3 
 
  RBC (4.70 - 6.10 /CUMM) 5.55 
 
  Hgb (14.0 - 18.0 G/DL) 16.7 
 
  Hct (42 - 52 %) 49.7 
 
  MCV (80.0 - 94.0 FL) 89.5 
 
  MCH (27.0 - 31.0 PG) 30.0 
 
  RDW (11.5 - 14.5 %) 14.0 
 
  Plt Count (130 - 400 /CUMM) 161 
 
  MPV (7.4 - 10.4 FL) 9.1 
 
  Gran % (42.2 - 75.2 %) 72.3 
 
  Lymphocytes % (20.5 - 51.1 %) 18.9  L 
 
  Monocytes % (1.7 - 9.3 %) 6.9 
 
  Eosinophils % (0 - 5 %) 1.3 
 
  Basophils % (0.0 - 2.0 %) 0.6 
 
  Absolute Granulocytes (1.4 - 6.5 /CUMM) 6.0 
 
  Absolute Lymphocytes (1.2 - 3.4 /CUMM) 1.6 
 
  Absolute Monocytes (0.10 - 0.60 /CUMM) 0.6 
 
  Absolute Eosinophils (0.0 - 0.7 /CUMM) 0.1 
 
  Absolute Basophils (0.0 - 0.2 /CUMM) 0 
 
  PUBS MCHC (33.0 - 37.0 G/DL) 33.5 
 
Urines  
 
  Urine Color (YEL,AMB,STR)  YEL
 
  Urine Clarity (CLEAR)  CLEAR
 
  Urine pH (5.0 - 8.0)  6.0
 
  Ur Specific Gravity (1.001 - 1.035)  1.020
 
  Urine Protein (NEG,<30 MG/DL)  NEG
 
  Urine Ketones (NEG)  NEG
 
  Urine Nitrite (NEG)  NEG
 
  Urine Bilirubin (NEG)  NEG
 
  Urine Urobilinogen (0.1  -  1.0 EU/dl)  0.2
 
  Ur Leukocyte Esterase (NEG)  NEG
 
  Ur Microscopic  SEDIMENT EXAMINED
 
  Urine RBC (0 - 5 /HPF)  1-3
 
  Urine WBC (0 - 2 /HPF)  RARE
 
  Urine Bacteria (NEG/NONE)  RARE  H
 
  Urine Mucus (FEW,NONE)  FEW
 
  Urine Hemoglobin (NEG)  TRACE-INTACT  H
 
  Urine Glucose (N MG/DL)  NEG

## 2017-03-04 NOTE — PATIENT DISCHARGE INSTRUCTIONS
Discharge Instructions
 
General Discharge Information
You were seen/treated for:
Lower GI bleeding
Watch for these problems:
Bleeding per rectum
 
Special Instructions:
-His follow-up with your PCP within 1 week after discharge
-Please follow-up with Dr. Anna in 10-14 days after discharge
 
 
Acute Coronary Syndrome
 
Inclusion Criteria
At DC or during hospital stay patient has or had the following:
ACS DIAGNOSIS No
 
Discharge Core Measures
Meds if any: Prescribed or Continued at Discharge
Meds if any: NOT Prescribed or Continued at Discharge
 
Congestive Heart Failure
 
Inclusion Criteria
At DC or during hospital stay patient has or had the following:
CHF DIAGNOSIS No
 
Discharge Core Measures
Meds if any: Prescribed or Continued at Discharge
Meds if any: NOT Prescribed or Continued at Discharge
 
Cerebrovascular accident
 
Inclusion Criteria
At DC or during hospital stay patient has or had the following:
CVA/TIA Diagnosis No
 
Discharge Core Measures
Meds if any: Prescribed or Continued at Discharge
Meds if any: NOT Prescribed or Continued at Discharge
 
Venous thromboembolism
 
Inclusion Criteria
VTE Diagnosis No
VTE Type NONE
VTE Confirmed by (Test) NONE
 

## 2017-03-04 NOTE — PN- HOUSESTAFF
**See Addendum**
Subjective
Follow-up For:
Lower GI bleeding
Subjective:
Patient was seen and examined this morning, he reported to bowel movement of 
solid stool mix it with blood and blood on toilet paper.  Patient denied 
abdominal pain but does reported abdominal discomfort across the stomach and at 
the left lower quadrant
'Throbbing in nature
'.  He denied nausea, vomiting.  He reported that his urine looks darker than 
usual.  He denied any dizziness, lightheadedness, visual changes, chest pain or 
palpitation.  Vital signs are stable.
 
Review of Systems
Constitutional:
Reports: see HPI. 
 
Objective
Last 24 Hrs of Vital Signs/I&O
 Vital Signs
 
 
Date Time Temp Pulse Resp B/P Pulse O2 O2 Flow FiO2
 
     Ox Delivery Rate 
 
 0654 98.2 63 20 139/86 95   
 
/ 0016  61  137/89 94   
 
 2207 97.4 70 20 152/102 96 Room Air  
 
 1811 98.2 75 20 148/98 96 Room Air  
 
 1728  84 18 142/93 97 Room Air  
 
 1510 97.9 67 18 138/83 98 Room Air  
 
 
 Intake & Output
 
 
  1600 /04 0800 / 0000
 
Intake Total  220 740
 
Output Total 600 1000 1000
 
Balance -600 -780 -260
 
    
 
Intake, IV  100 500
 
Intake, Oral  120 240
 
Number   1
 
Bowel   
 
Movements   
 
Output, Urine 600 1000 1000
 
Patient   127.006 kg
 
Weight   
 
 
 
 
Physical Exam
General Appearance: Alert, Oriented X3, Cooperative, No Acute Distress
Skin: No Rashes, No Breakdown, No Significant Lesion
HEENT: Atraumatic, PERRLA, EOMI, Mucous Membr. moist/pink
Neck: Supple
Cardiovascular: Regular Rate, Normal S1, Normal S2, No Murmurs
Lungs: Clear to Auscultation, Normal Air Movement
Abdomen: Normal Bowel Sounds, Soft, No Tenderness
Neurological: Normal Gait, Normal Speech, Strength at 5/5 X4 Ext, Normal Tone, 
Sensation Intact, Cranial Nerves 3-12 NL, Reflexes 2+
Extremities: No Clubbing, No Cyanosis, No Edema, Normal Pulses
 
Assessment/Plan
Assessment:
Mr. Mckeon 62 year old male with chief complaint of lower GI bleeding.  
Patient has past medical history of diverticulosis, diabetes, hypertension, GERD
, BPH, chronic back pain.
 
Sigmoidoscopy
Impression:
* Diverticulosis
* Enlarged internal hemorrhoids, without evidence of active or recent bleeding
 
 
 
 Plan
-Observation
-GI recommendation was obtained
-Advance the diet to regular diet
-CBC hemoglobin 15.4<15.6<16.7
-Continue home medication except for aspirin and nonsteroidal anti-inflammatory 
drugs
-Start NovoLog low-dose sliding scale TIDAC
-Code full
-DVT prophylaxis ALPS 
Problem List:
 1. Rectal bleeding
 
Pain Ratin
Pain Location:
Abdominal discomfort
Pain Goal: Pain 4 or less
Pain Plan:
Mild pain pathway
Tomorrow's Labs & Rationales:
CBC

## 2017-03-05 VITALS — SYSTOLIC BLOOD PRESSURE: 158 MMHG | DIASTOLIC BLOOD PRESSURE: 106 MMHG

## 2017-03-05 VITALS — SYSTOLIC BLOOD PRESSURE: 152 MMHG | DIASTOLIC BLOOD PRESSURE: 70 MMHG

## 2017-03-05 VITALS — DIASTOLIC BLOOD PRESSURE: 82 MMHG | SYSTOLIC BLOOD PRESSURE: 131 MMHG

## 2017-03-05 LAB
ABSOLUTE GRANULOCYTE CT: 4.5 /CUMM (ref 1.4–6.5)
BASOPHILS # BLD: 0 /CUMM (ref 0–0.2)
BASOPHILS NFR BLD: 0.3 % (ref 0–2)
EOSINOPHIL # BLD: 0.2 /CUMM (ref 0–0.7)
EOSINOPHIL NFR BLD: 2.2 % (ref 0–5)
ERYTHROCYTE [DISTWIDTH] IN BLOOD BY AUTOMATED COUNT: 14.1 % (ref 11.5–14.5)
GRANULOCYTES NFR BLD: 60.2 % (ref 42.2–75.2)
HCT VFR BLD CALC: 46.2 % (ref 42–52)
LYMPHOCYTES # BLD: 2.1 /CUMM (ref 1.2–3.4)
MCH RBC QN AUTO: 30.1 PG (ref 27–31)
MCHC RBC AUTO-ENTMCNC: 33.2 G/DL (ref 33–37)
MCV RBC AUTO: 90.8 FL (ref 80–94)
MONOCYTES # BLD: 0.7 /CUMM (ref 0.1–0.6)
PLATELET # BLD: 158 /CUMM (ref 130–400)
PMV BLD AUTO: 8.8 FL (ref 7.4–10.4)
RED BLOOD CELL CT: 5.09 /CUMM (ref 4.7–6.1)
WBC # BLD AUTO: 7.5 /CUMM (ref 4.8–10.8)

## 2017-03-05 NOTE — NUR
/114. SENT TEXT PAGE TO INTERN TO ADVISE.
PT ASYMPTOMATIC. HOLDING 1X BAG IV FLUIDS AT THIS TIME.
STARTED PT ON GO LYTELY AT 2030, PT ADVISED THAT HE DRANK THE ENTIRE GALLON
IN UNDER AN HOUR. PT HAS VOIDED ONLY 1X SINCE CONSUMING.
WILL CONTINUE TO MONITOR.

## 2017-03-05 NOTE — PN- GASTROENTEROLOGY
Assessment/Plan
Assessment/Recommendations:
Hematochezia, minor, intermittent.  Possible small diverticular bleed, versus 
hemorrhoidal (although hemorrhoids were not bleeding or friability time of 
sigmoidoscopy Friday).  Hematocrit has remained stable over more than 48 hours.
 
Recommendations
* Clear liquid diet now
* Agree with CT scan of the abdomen and pelvis with IV and po contrast
* If CT scan is negative for obstructive process or for diverticulitis/abscess, 
give GoLYTELY 1 gallon over 3-4 hours later today.  Will then proceed to 
colonoscopy tomorrow (nothing by mouth after midnight, IV fluids).
* Follow-up CBC in the morning
 
 
 
Subjective
Subjective:
Continues to have burning lower abdominal pain.  Passed a formed bowel movement 
this morning, accompanied by scant red blood.
 
Objective
Vital Signs and I&Os
Vital Signs
 
 
Date Time Temp Pulse Resp B/P Pulse O2 O2 Flow FiO2
 
     Ox Delivery Rate 
 
03/05 0922  60  131/82    
 
03/05 0638 96.8 60 20 131/82 98   
 
03/04 2300 97.7 72 18 132/80 95 Room Air  
 
03/04 2227  72  132/80    
 
03/04 1731  62  142/81    
 
03/04 1515 98.0 62 20 142/81 98 Room Air  
 
 
 Intake & Output
 
 
 03/05 1600 03/05 0400 03/04 1600 03/04 0400 03/03 1600 03/03 0400
 
Intake Total 300 480 970 740  
 
Output Total   1600 1000  
 
Balance 300 480 -630 -260  
 
       
 
Intake, IV   100 500  
 
Intake, Oral 300 480 870 240  
 
Number 1  0 1 1 
 
Bowel      
 
Movements      
 
Output, Urine   1600 1000  
 
Patient    280 lb 280 lb 
 
Weight      
 
 
 
 
Results
Pertinent Lab Results:
 Laboratory Tests
 
 
 03/05 03/05
 
 0935 0640
 
Chemistry  
 
  Hemoglobin A1c  Pending
 
  Lactic Acid (0.7 - 2.1 mmol/L) 1.9 
 
Hematology  
 
  CBC w Diff  NO MAN DIFF REQ
 
  WBC (4.8 - 10.8 /CUMM)  7.5
 
  RBC (4.70 - 6.10 /CUMM)  5.09
 
  Hgb (14.0 - 18.0 G/DL)  15.3
 
  Hct (42 - 52 %)  46.2
 
  MCV (80.0 - 94.0 FL)  90.8
 
  MCH (27.0 - 31.0 PG)  30.1
 
  RDW (11.5 - 14.5 %)  14.1
 
  Plt Count (130 - 400 /CUMM)  158
 
  MPV (7.4 - 10.4 FL)  8.8
 
  Gran % (42.2 - 75.2 %)  60.2
 
  Lymphocytes % (20.5 - 51.1 %)  27.9
 
  Monocytes % (1.7 - 9.3 %)  9.4  H
 
  Eosinophils % (0 - 5 %)  2.2
 
  Basophils % (0.0 - 2.0 %)  0.3
 
  Absolute Granulocytes (1.4 - 6.5 /CUMM)  4.5
 
  Absolute Lymphocytes (1.2 - 3.4 /CUMM)  2.1
 
  Absolute Monocytes (0.10 - 0.60 /CUMM)  0.7  H
 
  Absolute Eosinophils (0.0 - 0.7 /CUMM)  0.2
 
  Absolute Basophils (0.0 - 0.2 /CUMM)  0
 
  PUBS MCHC (33.0 - 37.0 G/DL)  33.2
 
 
 
 
 03/04 03/03
 
 0626 2023
 
Hematology  
 
  CBC w Diff NO MAN DIFF REQ NO MAN DIFF REQ
 
  WBC (4.8 - 10.8 /CUMM) 8.1 9.4
 
  RBC (4.70 - 6.10 /CUMM) 5.07 5.26
 
  Hgb (14.0 - 18.0 G/DL) 15.4 15.6
 
  Hct (42 - 52 %) 45.5 47.4
 
  MCV (80.0 - 94.0 FL) 89.6 90.3
 
  MCH (27.0 - 31.0 PG) 30.3 29.6
 
  RDW (11.5 - 14.5 %) 14.2 14.4
 
  Plt Count (130 - 400 /CUMM) 162 158
 
  MPV (7.4 - 10.4 FL) 9.3 8.7
 
  Gran % (42.2 - 75.2 %) 62.0 65.4
 
  Lymphocytes % (20.5 - 51.1 %) 25.8 24.7
 
  Monocytes % (1.7 - 9.3 %) 9.8  H 8.1
 
  Eosinophils % (0 - 5 %) 2.0 1.5
 
  Basophils % (0.0 - 2.0 %) 0.4 0.3
 
  Absolute Granulocytes (1.4 - 6.5 /CUMM) 5.0 6.1
 
  Absolute Lymphocytes (1.2 - 3.4 /CUMM) 2.1 2.3
 
  Absolute Monocytes (0.10 - 0.60 /CUMM) 0.8  H 0.8  H
 
  Absolute Eosinophils (0.0 - 0.7 /CUMM) 0.2 0.1
 
  Absolute Basophils (0.0 - 0.2 /CUMM) 0 0
 
  PUBS MCHC (33.0 - 37.0 G/DL) 33.8 32.8  L
 
 
 
 
 03/03 03/03
 
 1137 1055
 
Chemistry  
 
  Sodium (137 - 145 mmol/L) 138 
 
  Potassium (3.5 - 5.1 mmol/L) 4.1 
 
  Chloride (98 - 107 mmol/L) 101 
 
  Carbon Dioxide (22 - 30 mmol/L) 27 
 
  Anion Gap (5 - 16) 10 
 
  BUN (9 - 20 mg/dL) 16 
 
  Creatinine (0.7 - 1.2 mg/dL) 0.9 
 
  Estimated GFR (>60 ml/min) > 60 
 
  BUN/Creatinine Ratio (7 - 25 %) 17.8 
 
  Glucose (65 - 99 mg/dL) 98 
 
  Calcium (8.4 - 10.2 mg/dL) 9.5 
 
Coagulation  
 
  PT (9.4 - 12.5 SEC) 11.8 
 
  INR (0.90 - 1.17) 1.13 
 
Hematology  
 
  CBC w Diff NO MAN DIFF REQ 
 
  WBC (4.8 - 10.8 /CUMM) 8.3 
 
  RBC (4.70 - 6.10 /CUMM) 5.55 
 
  Hgb (14.0 - 18.0 G/DL) 16.7 
 
  Hct (42 - 52 %) 49.7 
 
  MCV (80.0 - 94.0 FL) 89.5 
 
  MCH (27.0 - 31.0 PG) 30.0 
 
  RDW (11.5 - 14.5 %) 14.0 
 
  Plt Count (130 - 400 /CUMM) 161 
 
  MPV (7.4 - 10.4 FL) 9.1 
 
  Gran % (42.2 - 75.2 %) 72.3 
 
  Lymphocytes % (20.5 - 51.1 %) 18.9  L 
 
  Monocytes % (1.7 - 9.3 %) 6.9 
 
  Eosinophils % (0 - 5 %) 1.3 
 
  Basophils % (0.0 - 2.0 %) 0.6 
 
  Absolute Granulocytes (1.4 - 6.5 /CUMM) 6.0 
 
  Absolute Lymphocytes (1.2 - 3.4 /CUMM) 1.6 
 
  Absolute Monocytes (0.10 - 0.60 /CUMM) 0.6 
 
  Absolute Eosinophils (0.0 - 0.7 /CUMM) 0.1 
 
  Absolute Basophils (0.0 - 0.2 /CUMM) 0 
 
  PUBS MCHC (33.0 - 37.0 G/DL) 33.5 
 
Urines  
 
  Urine Color (YEL,AMB,STR)  YEL
 
  Urine Clarity (CLEAR)  CLEAR
 
  Urine pH (5.0 - 8.0)  6.0
 
  Ur Specific Gravity (1.001 - 1.035)  1.020
 
  Urine Protein (NEG,<30 MG/DL)  NEG
 
  Urine Ketones (NEG)  NEG
 
  Urine Nitrite (NEG)  NEG
 
  Urine Bilirubin (NEG)  NEG
 
  Urine Urobilinogen (0.1  -  1.0 EU/dl)  0.2
 
  Ur Leukocyte Esterase (NEG)  NEG
 
  Ur Microscopic  SEDIMENT EXAMINED
 
  Urine RBC (0 - 5 /HPF)  1-3
 
  Urine WBC (0 - 2 /HPF)  RARE
 
  Urine Bacteria (NEG/NONE)  RARE  H
 
  Urine Mucus (FEW,NONE)  FEW
 
  Urine Hemoglobin (NEG)  TRACE-INTACT  H
 
  Urine Glucose (N MG/DL)  NEG

## 2017-03-05 NOTE — PN- HOUSESTAFF
**See Addendum**
Subjective
Follow-up For:
GI bleed
Abdominal discomfort
Complaints: Diffuse abdominal pain
Subjective:
Interval history:
Morning Mr. Mckeon reports as night he was woken up by diffuse abdominal 
discomfort with what he describes as a "burning sensation".  This was followed 
by one bowel movement with a small amount of formed stool and mai red blood.
He denies any dizziness associated with this episode.
He does report persistence in his abdominal discomfort and some mild nausea.  He
denies any fevers, chills, palpitations, shortness of breath at this time.
 
Review of Systems
Constitutional:
Reports: see HPI. 
EENTM:
Reports: no symptoms. 
Cardiovascular:
Reports: no symptoms. 
Respiratory:
Reports: no symptoms. 
Gastrointestinal:
Reports: see HPI. 
Genitourinary:
Reports: no symptoms. 
Musculoskeletal:
Reports: no symptoms. 
 
Objective
Last 24 Hrs of Vital Signs/I&O
 Vital Signs
 
 
Date Time Temp Pulse Resp B/P Pulse O2 O2 Flow FiO2
 
     Ox Delivery Rate 
 
03/05 0922  60  131/82    
 
03/05 0638 96.8 60 20 131/82 98   
 
03/04 2300 97.7 72 18 132/80 95 Room Air  
 
03/04 2227  72  132/80    
 
03/04 1731  62  142/81    
 
03/04 1515 98.0 62 20 142/81 98 Room Air  
 
 
 Intake & Output
 
 
 03/05 1600 03/05 0800 03/05 0000
 
Intake Total  300 480
 
Output Total   
 
Balance  300 480
 
    
 
Intake, Oral  300 480
 
Number  1 
 
Bowel   
 
Movements   
 
 
 
 
Physical Exam
General Appearance: Alert, Cooperative, No Acute Distress
Skin: No Rashes, No Breakdown
HEENT: EOMI, Mucous Membr. moist/pink
Cardiovascular: Regular Rate, Normal S1, Normal S2
Lungs: Clear to Auscultation, Normal Air Movement
Abdomen: Hyperactive bowel sounds. Mild tenderness elicited on palpation of the 
abdomen diffusely
Neurological: Normal Speech
Extremities: No Edema, Normal Pulses
Current Medications:
 Current Medications
 
 
  Sig/Barb Start time  Last
 
Medication Dose Route Stop Time Status Admin
 
Acetaminophen 650 MG Q6P PRN 03/03 1930 AC 
 
  PO   
 
Acetaminophen 1,000 MG Q6P PRN 03/03 1930 AC 03/04
 
  IV   2100
 
Calcium Carbonate 500 MG .STK-MED ONE 03/05 0018 DC 
 
  PO 03/05 0019  
 
Calcium Carbonate 500 MG ONCE ONE 03/04 2300 DC 03/05
 
  PO 03/04 2301  0022
 
Insulin Aspart 0 TIDAC 03/04 1200 AC 
 
  SC   
 
Losartan Potassium 50 MG DAILY 03/04 1600 AC 03/05
 
  PO   0922
 
Montelukast Sodium 10 MG DAILY 03/04 1000 AC 03/05
 
  PO   0923
 
Morphine Sulfate 2 MG Q6-PRN PRN 03/03 1945 AC 
 
  IV   
 
Omeprazole 20 MG DAILY AC 03/04 0700 AC 03/05
 
  PO   0627
 
Tamsulosin HCl 0.4 MG QPM 03/04 2200 AC 03/04
 
  PO   2227
 
 
 
 
Last 24 Hrs of Lab/Farooq Results
Last 24 Hrs of Labs/Mics:
 Laboratory Tests
 
03/05/17 0935:
Lactic Acid Pending
 
03/05/17 0640:
CBC w Diff NO MAN DIFF REQ, RBC 5.09, MCV 90.8, MCH 30.1, RDW 14.1, MPV 8.8, 
Gran % 60.2, Lymphocytes % 27.9, Monocytes % 9.4  H, Eosinophils % 2.2, 
Basophils % 0.3, Absolute Granulocytes 4.5, Absolute Lymphocytes 2.1, Absolute 
Monocytes 0.7  H, Absolute Eosinophils 0.2, Absolute Basophils 0, PUBS MCHC 33.2
 
 
Assessment/Plan
Assessment:
62-year-old gentleman with a PMH of diverticulosis, DM, HTN, GERD, BPH and 
chronic back pain who presented with complaints of abdominal discomfort and 
bright red blood per rectum.
Patient also endorses a recent episode of dark tarry stools followed by bright 
red blood per rectum recently.  He did undergo a flex sigmoidoscopy on 3/3/17 
with findings consistent of: Diverticulosis, enlarged internal hemorrhoids 
without evidence of active or recent bleeding.
Recommendations were for continued observation, clear liquid diet and CBC 
trending
 
Problem list:
1.  GI bleed
2.  Enlarged internal hemorrhoids
3.  Abdominal discomfort
4.  DM
5.  Hypertension
 
Plan:
1.  GI bleed
* We'll obtain lactic acid levels
* We'll consider abdominal x-ray/CT abdomen and pelvis with contrast for DDX of 
ischemic colitis
* Continue with clear liquids at this time and advance as tolerated
2.  Enlarged internal hemorrhoids
* Stable at this time
3.  Abdominal discomfort
* Supportive therapy at this time
* We'll start patient on lactose-free diet for DDX of lactose intolerance
* Symptoms may be secondary to gastroparesis with his history of diabetes.  Will
follow hemoglobin A1c
* If loose stools, will send of a C. difficile
4.  DM
* Low dose sliding scale 
5.  Hypertension
* Continue losartan 59 g daily
6.  CODE STATUS
* Full code
7.  DVT prophylaxis
* ALPS in the setting of GI bleed
Problem List:
 1. Rectal bleeding
 
 2. Abdominal pain
 
 3. Diabetes
 
 4. HTN (hypertension)
 
 5. BPH (benign prostatic hyperplasia)
 
Pain Rating: 3
Pain Location:
Diffuse abdomen
Pain Goal: Pain 4 or less
Pain Plan:
IV tylenol PRN
Tomorrow's Labs & Rationales:
CBC
DVT/Prophylaxis: mechanical

## 2017-03-05 NOTE — CT SCAN REPORT
EXAM:
CTA Abdomen and Pelvis
 
CLINICAL INDICATION:
Persistent abdominal pain. Melena and hematochezia.
 
TECHNIQUE:
Axial multidetector CTA scan of the abdomen and pelvis was performed
following the intravenous administration of 95 cc Optiray 350.  Coronal and
sagittal reformatted images were also obtained at the acquisition
workstation.
 
DLP:
831 mGy-cm
 
COMPARISON:
CTA abdomen pelvis 07/29/2010
 
FINDINGS:
 
VASCULAR:
Visualized portions of the inferior thoracic aorta are mildly ectatic but
normal in caliber. The abdominal aorta is normal in caliber without
significant atherosclerotic disease. The celiac axis, superior mesenteric
artery and inferior mesenteric artery are widely patent. There are 2
right-sided renal arteries and 3 left-sided renal arteries. Dominant renal
arteries are widely patent. Bilateral common and external iliac arteries are
ectatic but patent. Bilateral internal iliac arteries are patent. Bilateral
common femoral arteries are patent.
 
NON-VASCULAR:
Visualized lung bases are well aerated.
 
The liver demonstrates normal size, contour and attenuation. No intrahepatic
biliary ductal dilatation. The gallbladder is normal in appearance.
 
The pancreas, spleen and adrenal glands are unremarkable.
 
The kidneys demonstrate symmetric nephrograms. No nephrolithiasis or
hydronephrosis. Bilateral subcentimeter renal hypodensities are too small to
accurately characterize.
 
Normal caliber loops of small and large bowel. Moderate to severe colonic
diverticulosis without CT evidence to suggest active diverticulitis. Subtle
mesenteric stranding within the left mid abdomen, a stable finding. Normal
appendix. Stable small to moderate-sized fat-containing umbilical hernia.
 
The bladder is underdistended and therefore cannot be accurately evaluated.
The prostate gland is enlarged measuring 5.3 cm in transverse dimension. No
pelvic lymphadenopathy.
 
Mild degenerative changes of the spine.
 
IMPRESSION:
1. Normal caliber abdominal aorta. Mesenteric vessels are widely patent.
2. Moderate to severe colonic diverticulosis without CT evidence to suggest
active diverticulitis.
3. Stable small to moderate-sized fat-containing umbilical hernia.
4. Enlarged prostate gland.

## 2017-03-06 VITALS — SYSTOLIC BLOOD PRESSURE: 140 MMHG | DIASTOLIC BLOOD PRESSURE: 90 MMHG

## 2017-03-06 VITALS — DIASTOLIC BLOOD PRESSURE: 94 MMHG | SYSTOLIC BLOOD PRESSURE: 148 MMHG

## 2017-03-06 LAB
ABSOLUTE GRANULOCYTE CT: 5.2 /CUMM (ref 1.4–6.5)
BASOPHILS # BLD: 0 /CUMM (ref 0–0.2)
BASOPHILS NFR BLD: 0.4 % (ref 0–2)
EOSINOPHIL # BLD: 0.2 /CUMM (ref 0–0.7)
EOSINOPHIL NFR BLD: 2.1 % (ref 0–5)
ERYTHROCYTE [DISTWIDTH] IN BLOOD BY AUTOMATED COUNT: 14.2 % (ref 11.5–14.5)
GRANULOCYTES NFR BLD: 60.4 % (ref 42.2–75.2)
HCT VFR BLD CALC: 45.5 % (ref 42–52)
LYMPHOCYTES # BLD: 2.5 /CUMM (ref 1.2–3.4)
MCH RBC QN AUTO: 30 PG (ref 27–31)
MCHC RBC AUTO-ENTMCNC: 33.1 G/DL (ref 33–37)
MCV RBC AUTO: 90.7 FL (ref 80–94)
MONOCYTES # BLD: 0.7 /CUMM (ref 0.1–0.6)
PLATELET # BLD: 172 /CUMM (ref 130–400)
PMV BLD AUTO: 8.8 FL (ref 7.4–10.4)
PROTHROMBIN TIME: 13.2 SEC (ref 9.4–12.5)
RED BLOOD CELL CT: 5.02 /CUMM (ref 4.7–6.1)
WBC # BLD AUTO: 8.7 /CUMM (ref 4.8–10.8)

## 2017-03-06 NOTE — DISCHARGE SUMMARY
Visit Information
 
Visit Dates
Admission Date:
03/03/17
 
Discharge Date:
03/06/17
 
 
Hospital Course
 
Course
Attending Physician:
ROSIBEL JARA MD
 
Primary Care Physician:
UMESH SUMNER MD
 
Hospital Course:
Mr Mckeon is a 62-year-old American gentleman with a PMH of HTN, HLD, diabetes
, chronic back pain and BPH presented to [facility] with complaints of bright red 
blood per rectum.
 
Symptoms started the day prior with painless bright red blood per rectum with a 
low amount of stool.  He reported 7 subsequent episodes with what associated 
abdominal discomfort that he described as a burning sensation.  He denied any 
cramping, dizziness, lightheadedness, chest pain, palpitations or shortness of 
breath.
 
He did undergo a flexible sigmoidoscopy findings positive for diverticulosis and
an enlarged internal hemorrhoid without evidence of active bleeding.  The 
patient was initially admitted under observation overnight.  The following 
morning he did complain of persistent abdominal discomfort for which we further 
worked up.
 
VS on admission: /108, HR 96, RR 18, SPO2 97% on RA, T 97.6
PE on admission: No acute distress, mucous membranes pink and moist.  RRR, 
normal S1/S2.  Lungs CTA BL.  Bowel sounds, soft, nontender to palpation.  No 
focal deficits.  No evidence of edema in the lower extremities.  Pulses 
symmetrical
Pertinent labs: WBC 8.3, H&H 16.7/49.7, platelets 161, BUN/CR 16/0.9, hemoglobin
A1c 6.4, INR 1.13
 
Admitted the patient to the medicine floor for management of following problems:
1.  Bright red blood per rectum
2.  Abdominal pain
3.  Hypertension
4.  Diabetes
 
Hospital course:
1.  Bright red blood per rectum
* H&E and underwent a flexible sigmoidoscopy on 3/3/2017 performed by Dr. Post
* Impression: Diverticulosis. Enlarged internal hemorrhoids, without evidence of
active or recent bleeding.  Plan was for observation, clear liquid diet with 
repeat CBC which remained stable throughout the hospital course
2.  Abdominal pain
* Patient continued to complain of a diffuse burning sensation in his abdomen. 
On hospital day #2 he had 2 well formed bowel movements that were black in color
, guaiac positive
* We obtained a lactic acid and a CT abdomen with contrast.  Lactic acid result 
came back normal at 1.9.
* CT abdomen pelvis with contrast: Normal caliber abdominal aorta. Mesenteric 
vessels are widely patent. Moderate to severe colonic diverticulosis without CT 
evidence to suggest active diverticulitis. Stable small to moderate-sized fat-
containing umbilical hernia. Enlarged prostate gland.
* The patient was prepped with GoLYTELY at night and underwent a colonoscopy the
following day
* Colonoscopy report: An diverticulosis, diminutive cecal polyp, enlarged 
internal hemorrhoids.  Biopsies were obtained with pathology to be followed up 
as an outpatient.  He tolerated diet advancement and we discharged the patient 
on omeprazole 40 mg PO BID
* Patient will require repeat colonoscopy in one year
3.  Hypertension
* We initially held his BP medications out of caution in the setting of GI 
bleed.  Throughout the hospital course his blood pressure remained stable.  
Continued him on losartan 50 mg daily
4.  Diabetes
* Hemoglobin A1c 6.4.  Maintain the patient on a low-dose sliding scale with 
sugars well controlled throughout hospital stay
Allergies:
Coded Allergies:
STATINS (Severe, MUSCLE ACHING, "TAKES MY BREATH AWAY" 03/02/17)
soybean (Severe, "CAN'T BREATHE" 03/02/17)
 
Significant Procedures:
Flexible sigmoidoscopy: Reported as indicated above
Colonoscopy: report as indicated above
 
Disposition Summary
 
Disposition
Principal Diagnosis:
GI bleed
Additional Diagnosis:
Abdominal pain
Hypertension
Diabetes
Discharge Disposition: home or self care
 
Discharge Instructions
 
General Discharge Information
Code Status: Full Code
Patient's Diet:
Diabetic
Patient's Activity:
As tolerated
Follow-Up Instructions/Appts:
These follow-up with her PCP within 1-2 weeks after discharge.
Please follow-up with your gastroenterologist Dr. Anna within 1-2 weeks after 
discharge
 
Medications at Discharge
Discharge Medications:
Continue taking these medications:
Cyclobenzaprine HCl (Cyclobenzaprine HCl) 10 MG TABLET
    1 Tablet ORAL Every night as needed
    Qty = 30
    Comments:
       NOT GIVEN IN HOSPITAL
 
Ammonium Lactate (Ammonium Lactate) 12 % LOTION
     On the skin TWICE DAILY
    Qty = 400
    Comments:
       NOT GIVEN IN HOSPITAL
 
Meloxicam (Meloxicam) 15 MG TABLET
    1 Tablet ORAL DAILY AS NEEDED
    Qty = 90
    Comments:
       NOT GIVEN IN HOSPITAL
 
Montelukast Sodium (Montelukast Sodium) 10 MG TABLET
    1 Tablet ORAL DAILY
    Qty = 90
    Comments:
       Last Taken: 3/6/17
             Time: 0942 
 
Sitagliptin Phos/Metformin HCl (Janumet  MG Tablet) 50 MG-500 MG TABLET
    1 Tablet ORAL TWICE DAILY
    Qty = 180
    Comments:
       DID NOT RECEIVE IN HOSPITAL
 
Testosterone Cypionate (Testosterone Cypionate) 200 MG/ML VIAL
    1 Milliliters INTRAMUSC EVERY 2 WEEKS
    Qty = 10
    Comments:
       NOT GIVEN IN HOSPITAL
 
Tamsulosin HCl (Tamsulosin HCl) 0.4 MG CAP.ER.24H
    1 Capsule ORAL Every night
    Qty = 90
    Comments:
       Last Taken: 3/5/17
             Time: 8:40 PM
 
Dexlansoprazole (Dexilant) 60 MG CAP.DR.BP
    1 Capsule ORAL DAILY
    Qty = 90
    Comments:
       NOT GIVEN IN HOSPITAL
 
Olmesartan/Hydrochlorothiazide (Benicar Hct 20-12.5 MG Tablet) 20 MG-12.5 MG 
TABLET
    1 Tablet ORAL DAILY
    Qty = 90
    Comments:
       Last Taken: 3/6/17
             Time: 0942 AM
       COZAAR TAKEN
 
Azelastine HCl (Azelastine HCl) 137 MCG (0.1 %) SPRAY.PUMP
    2 Spray Both sides of nose 2 x Daily as needed as needed for ITCHING
    Qty = 30
    Comments:
       NOT GIVEN IN HOSPITAL
 
Lidocaine/Hydrocortisone AC (Lidocaine-Hc 3-1% Cream Kit) 3 %-1 % (7 GRAM) KIT
     RECTALLY THREE TIMES A DAY AS NEEDED
    Qty = 1
    Comments:
       NOT GIVEN IN HOSPTIAL
 
Start taking the following new medications:
Omeprazole (Omeprazole) 40 MG CAPSULE.DR
    1 Capsule ORAL TWICE DAILY
    Qty = 60
    No Refills
    Comments:
       Last Taken: 3/6/17
             Time: 6:30 pm
 
 
Copies To:
TAISHA BAIRD,UMESH OVIEDO; ANGELA BAIRD,NATE KIMBROUGH; MICA BAIRD,MELCHOR Gil MD Review Statement
Documenting Attending:
ROSIBEL JARA MD
Other Findings:
The patient was seen and discussed with house staff. Agree with plan of care 
upon discharge.

## 2017-03-06 NOTE — PROC NOTE COLONOSCOPY
Colonoscopy Procedure
Procedure Date: 03/06/17
Procedure Type: colonoscopy w/polypectomy
:
Quan Post M.D.
ASA Classification: III
Indications:
Lower GI bleeding
Abdominal pain
Instrument (Colonoscope): single channel
Meds Received: MAC
Patient's Tolerance: good
Complications: none
Extent Reached: cecum
Prep: fair to poor
Procedure:
The patient signed informed consent, was placed in the Griffith position, and 
medicated.  Examination of the rectum and prostate was normal.  The Olympus high
-definition variable stiffness colonoscope was inserted through the anus and 
advanced to the cecum, identified by the appendiceal orifice and ileocecal 
valve.  Careful examination was performed.  There was adequate withdrawal time.
Findings:
There were enlarged internal hemorrhoids, without active bleeding.  The rectum 
was otherwise normal.  Diverticula extended from the sigmoid to the right colon.
 In the cecum was a diminutive polyp, removed with electrocautery snare and 25 
W. coagulative current.  Given the degree of preparation, other polyps and 
angiodysplasias may been obscured.  Of note there was no visible blood, neither 
old nor fresh, throughout the course of the procedure.
Impression:
* Pandiverticulosis
* Diminutive cecal polyp
* Enlarged internal hemorrhoids
Recommendations:
* Await pathology
* Increase omeprazole to 40 mg by mouth twice a day
* Discharge patient
* Outpatient follow-up with Dr. Anna
Followup Colonscopy Screen In: in 1 year
CC:
UMESH SUMNER MD; MICA BAIRD,MELCHOR

## 2017-03-06 NOTE — PN- HOUSESTAFF
DELORIS BAIRD,[facility] 17 0710:
Subjective
Follow-up For:
Lower GI bleeding
Subjective:
Patient was seen and examined this morning, he is scheduled for colonoscopy.  He
reported bloody diarrhea after having the colonoscopy prep.  He denied any 
abdominal pain today although he had throbbing abdominal pain yesterday.  Denied
nausea or vomiting.  Patient denied any lightheadedness or dizziness on 
ambulation.
 
Review of Systems
Constitutional:
Reports: see HPI. 
 
Objective
Last 24 Hrs of Vital Signs/I&O
 Vital Signs
 
 
Date Time Temp Pulse Resp B/P Pulse O2 O2 Flow FiO2
 
     Ox Delivery Rate 
 
 1504 97.8 63 20 140/90 97 Room Air  
 
 0942    132/90    
 
 0554 96.8 62 18 148/94 95 Room Air  
 
 2342 97.3 70 20 158/106 96 Room Air  
 
 2024    152/70    
 
 
 Intake & Output
 
 
  1600  0800  0000
 
Intake Total  0 4100
 
Output Total 400  
 
Balance -400 0 4100
 
    
 
Intake, IV   300
 
Intake, Oral  0 3800
 
Number 0  
 
Bowel   
 
Movements   
 
Output, Urine 400  
 
 
 
 
Physical Exam
General Appearance: Alert, Oriented X3, Cooperative, No Acute Distress
Skin: No Rashes, No Breakdown, No Significant Lesion
HEENT: Atraumatic, PERRLA, EOMI, Mucous Membr. moist/pink
Neck: Supple
Cardiovascular: Regular Rate, Normal S1, Normal S2, No Murmurs
Lungs: Clear to Auscultation, Normal Air Movement
Abdomen: Normal Bowel Sounds, Soft, mild tenderness over lower abdomen and right
upper and lower quadrant
Neurological: Normal Gait, Normal Speech, Strength at 5/5 X4 Ext, Normal Tone, 
Sensation Intact, Cranial Nerves 3-12 NL, Reflexes 2+
Extremities: No Clubbing, No Cyanosis, No Edema, Normal Pulses
 
Assessment/Plan
Assessment:
62-year-old gentleman with a PMH of diverticulosis, DM, HTN, GERD, BPH and 
chronic back pain who presented with complaints of abdominal discomfort and 
bright red blood per rectum.
Patient also endorses a recent episode of dark tarry stools followed by bright 
red blood per rectum recently.  He did undergo a flex sigmoidoscopy on 3/3/17 
with findings consistent of: Diverticulosis, enlarged internal hemorrhoids 
without evidence of active or recent bleeding.
Recommendations were for continued observation, clear liquid diet and CBC 
trending
 
Problem list:
-GI bleed
-DM
-Hypertension
 
Plan:
#GI bleed
* Patient had sigmoidoscopy on admission that revealed 
-Diverticulosis
-Enlarged internal hemorrhoids, without evidence of active or recent bleeding
* Patient is scheduled for colonoscopy today, continued to have bloody stool 
with colon Preparation
* Colonoscopy revealed
-Pandiverticulosis
-Diminutive cecal polyp
-Enlarged internal hemorrhoids
* Lactic acid and abdomen CTA with obtained yesterday due to abdominal pain 
where; negative for ischemic colitis
* Hemoglobin remains stable
* Recommendation to increase omeprazole to 40 mg twice a day 
* Recommendation for discharge today
* Patient will follow up as an outpatient with Dr. Anna
* Follow-up colonoscopy screen within 1 year
 
#DM
* Low dose sliding scale 
#Hypertension
* Continue losartan 59 g daily
 
CODE STATUS
* Full code
DVT prophylaxis
* ALPS in the setting of GI bleed
Diet
* Advanced to regular diet after colonoscopy
 
Consultation; GI
Problem List:
 1. BPH (benign prostatic hyperplasia)
 
 2. HTN (hypertension)
 
 3. Diabetes
 
 4. Rectal bleeding
 
Pain Ratin
Pain Location:
None
Pain Goal: Pain 4 or less
Pain Plan:
Mild pain pathway
Tomorrow's Labs & Rationales:
NONE 
 
 
ROSIBEL JARA MD 17 2226:
Attending MD Review Statement
 
Attending Statement
Attending MD Statement: examined this patient, discuss w/resident/PA/NP, agreed 
w/resident/PA/NP, reviewed EMR data (avail), discussed with nursing, discussed 
with case mgmt, amended to note
Attending Assessment/Plan:
The patient was seen and discussed with house staff. Agree with plan of care as 
outlined.

## 2018-05-29 ENCOUNTER — HOSPITAL ENCOUNTER (OUTPATIENT)
Dept: HOSPITAL 68 - STS | Age: 63
End: 2018-05-29
Attending: COLON & RECTAL SURGERY
Payer: COMMERCIAL

## 2018-05-29 VITALS — BODY MASS INDEX: 41.83 KG/M2 | WEIGHT: 276 LBS | HEIGHT: 68 IN

## 2018-05-29 DIAGNOSIS — K57.90: ICD-10-CM

## 2018-05-29 DIAGNOSIS — F17.290: ICD-10-CM

## 2018-05-29 DIAGNOSIS — K21.9: ICD-10-CM

## 2018-05-29 DIAGNOSIS — I10: ICD-10-CM

## 2018-05-29 DIAGNOSIS — K64.1: Primary | ICD-10-CM

## 2018-05-29 DIAGNOSIS — E11.9: ICD-10-CM

## 2018-05-29 DIAGNOSIS — Z79.84: ICD-10-CM

## 2018-05-29 DIAGNOSIS — E66.01: ICD-10-CM

## 2018-05-29 NOTE — OPERATIVE REPORT
Operative/Inv Procedure Report
Surgery Date: 05/29/18
Name of Procedure:
Stapled hemorrhoidectomy/stapled hemorrhoidal pexy
Pre-Operative Diagnosis:
Grade 2 internal hemorrhoids
Post-Operative Diagnosis:
Grade 2 internal hemorrhoids
Estimated Blood Loss: kim
Surgeon/Assistant:
Kyle Michel Jr., DO
 
Anesthesia: laryngeal mask airway, block
Monitors:
per routine
Specimens:
Rectal mucosa was internal hemorrhoids
Complications:
None
Condition:
Good
Operative Indication:
Is a 63-year-old gentleman who had been taking care of for some time now.  His 
chief complaint is rectal bleeding patient has large internal hemorrhoids.  We 
have tried diet and left some modifications as well as rubber band ligations 
without perceptible control of his rectal bleeding.  He presents today for 
staple hemorrhoidectomy
 
Operative/Procedure Note
Note:
On the morning of his procedure the patient did a Fleet enema at home.  He 
presented to [facility] was taken to the operating room.  He was placed in
the supine position on the operating room table and underwent induction of 
general anesthesia with placement of laryngeal mask airway.  He was converted to
lithotomy position in [facility].  The perineum was prepped and draped 
in usual fashion.  An anal block was performed using 0.5% Marcaine with 
epinephrine, a total of 30 mL was injected.  Next a Fansler operating 
proctoscope was placed in the anal canal and a irrigated away any residual stool
and enema.  The stationary anoscope was then inserted using the trocar device 
and sutured into place with 0 Vicryl suture.  The slotted anoscope was then 
inserted and a pursestring was performed 6 cm cephalad to the dentate line 
starting in left lateral position and then going 360 until the tails met.  The 
slotted scope was then removed I pulled the pursestring tightened to tested and 
there was no gapping.  The EEA anvil of the stapling device was then placed with
the head of the anvil cephalad to the pursestring.  The pursestring was tied 
snugly around the PEG of the anvil and then secured to the anvil at the second 
slot Purstring suture.  The stapler was mated to the anvil and the stapler was 
closed slowly until it in the fire ready position.  The stapler was allowed to 
settle here for 1 minute.  Stapler was then armed and fired.  The stapler was 
then opened and completely removed.  I had an excellent doughnut on the stapling
device.  I inspected the staple line quadrant by quadrant for bleeding there was
none.  So this point the procedure was concluded and the stationary anoscope was
removed.  The perineum was cleansed and dried and a bulky dressing was placed 
over the anus and the patient was converted back to supine.  Patient tolerated 
the procedure well taken recovery area in good condition.  At the end of this 
operational needle sponges and attachments were accounted for.

## 2019-09-30 NOTE — NUR
[address]  received from [address] [facility] and assumed care of patient. Patient tolerated treatment well with no adverse reactions noted. Port flushed and deaccessed per protocol. 1740 Patient discharged from [facility] ambulatory and in no distress. TRIAGE NOTE ACKNOWLEDGED AND RN CARE ASSUMED
PT EVALUATED BY PA STUDENT AND JHON SCHAFER